# Patient Record
Sex: FEMALE | Race: BLACK OR AFRICAN AMERICAN | Employment: UNEMPLOYED | URBAN - METROPOLITAN AREA
[De-identification: names, ages, dates, MRNs, and addresses within clinical notes are randomized per-mention and may not be internally consistent; named-entity substitution may affect disease eponyms.]

---

## 2022-03-22 ENCOUNTER — HOSPITAL ENCOUNTER (EMERGENCY)
Age: 32
Discharge: HOME | End: 2022-03-22
Attending: EMERGENCY MEDICINE
Payer: MEDICAID

## 2022-03-22 VITALS
SYSTOLIC BLOOD PRESSURE: 164 MMHG | TEMPERATURE: 98.1 F | RESPIRATION RATE: 20 BRPM | HEART RATE: 102 BPM | DIASTOLIC BLOOD PRESSURE: 106 MMHG | OXYGEN SATURATION: 100 %

## 2022-03-22 DIAGNOSIS — G44.019 EPISODIC CLUSTER HEADACHE, NOT INTRACTABLE: Primary | ICD-10-CM

## 2022-03-22 PROCEDURE — 99283 EMERGENCY DEPT VISIT LOW MDM: CPT

## 2022-03-22 PROCEDURE — 6360000002 HC RX W HCPCS: Performed by: EMERGENCY MEDICINE

## 2022-03-22 PROCEDURE — 96374 THER/PROPH/DIAG INJ IV PUSH: CPT

## 2022-03-22 PROCEDURE — 96375 TX/PRO/DX INJ NEW DRUG ADDON: CPT

## 2022-03-22 PROCEDURE — 2580000003 HC RX 258: Performed by: EMERGENCY MEDICINE

## 2022-03-22 RX ORDER — KETOROLAC TROMETHAMINE 30 MG/ML
15 INJECTION, SOLUTION INTRAMUSCULAR; INTRAVENOUS ONCE
Status: COMPLETED | OUTPATIENT
Start: 2022-03-22 | End: 2022-03-22

## 2022-03-22 RX ORDER — LEVOTHYROXINE SODIUM 0.12 MG/1
250 TABLET ORAL
COMMUNITY
Start: 2022-02-01

## 2022-03-22 RX ORDER — NAPROXEN 500 MG/1
500 TABLET ORAL 2 TIMES DAILY WITH MEALS
Qty: 30 TABLET | Refills: 0 | Status: SHIPPED | OUTPATIENT
Start: 2022-03-22 | End: 2022-03-22 | Stop reason: SDUPTHER

## 2022-03-22 RX ORDER — PROCHLORPERAZINE EDISYLATE 5 MG/ML
10 INJECTION INTRAMUSCULAR; INTRAVENOUS ONCE
Status: COMPLETED | OUTPATIENT
Start: 2022-03-22 | End: 2022-03-22

## 2022-03-22 RX ORDER — SODIUM CHLORIDE, SODIUM LACTATE, POTASSIUM CHLORIDE, AND CALCIUM CHLORIDE .6; .31; .03; .02 G/100ML; G/100ML; G/100ML; G/100ML
1000 INJECTION, SOLUTION INTRAVENOUS ONCE
Status: COMPLETED | OUTPATIENT
Start: 2022-03-22 | End: 2022-03-22

## 2022-03-22 RX ORDER — DIPHENHYDRAMINE HYDROCHLORIDE 50 MG/ML
25 INJECTION INTRAMUSCULAR; INTRAVENOUS ONCE
Status: COMPLETED | OUTPATIENT
Start: 2022-03-22 | End: 2022-03-22

## 2022-03-22 RX ORDER — NAPROXEN 500 MG/1
500 TABLET ORAL 2 TIMES DAILY WITH MEALS
Qty: 30 TABLET | Refills: 0 | Status: SHIPPED | OUTPATIENT
Start: 2022-03-22

## 2022-03-22 RX ADMIN — PROCHLORPERAZINE EDISYLATE 10 MG: 5 INJECTION INTRAMUSCULAR; INTRAVENOUS at 13:38

## 2022-03-22 RX ADMIN — KETOROLAC TROMETHAMINE 15 MG: 30 INJECTION, SOLUTION INTRAMUSCULAR at 13:37

## 2022-03-22 RX ADMIN — SODIUM CHLORIDE, SODIUM LACTATE, POTASSIUM CHLORIDE, AND CALCIUM CHLORIDE 1000 ML: .6; .31; .03; .02 INJECTION, SOLUTION INTRAVENOUS at 13:37

## 2022-03-22 RX ADMIN — DIPHENHYDRAMINE HYDROCHLORIDE 25 MG: 50 INJECTION, SOLUTION INTRAMUSCULAR; INTRAVENOUS at 13:38

## 2022-03-22 ASSESSMENT — PAIN DESCRIPTION - FREQUENCY: FREQUENCY: INTERMITTENT

## 2022-03-22 ASSESSMENT — PAIN DESCRIPTION - PAIN TYPE: TYPE: ACUTE PAIN

## 2022-03-22 ASSESSMENT — PAIN SCALES - GENERAL: PAINLEVEL_OUTOF10: 6

## 2022-03-22 ASSESSMENT — PAIN DESCRIPTION - DESCRIPTORS: DESCRIPTORS: HEADACHE;SQUEEZING

## 2022-03-22 ASSESSMENT — PAIN DESCRIPTION - LOCATION: LOCATION: HEAD

## 2022-03-22 NOTE — ED PROVIDER NOTES
[address] [facility]          ATTENDING PHYSICIAN NOTE       Date of evaluation: 3/22/2022    Chief Complaint     Headache (intertmittent pressure headache, +nausea, wakes up with some blood in nose, left eye intermittently twitches)      History of Present Illness     Lou Perez is a 32 y.o. female who presents with 2 weeks of headaches. Possibly had some mild URI type symptoms but endorses diffuse pressure on the head occasional twitching of the left eye and then she has had daily nosebleeds with spotting in her right nare. These quickly go away in the morning with pressure. No large volume bleed. She endorses slow intermittent headache some mild neck pain but no fever stiffness altered mental status trauma or any other issues. It is a bilateral frontal and posterior headache. Denies any other weakness numbness tingling or any other issues. Does endorse occasional eye tearing in R eye with the headaches. Review of Systems     Review of Systems  A full 10 point review of systems was obtained. Pertinent positives and negatives as documented in the HPI, otherwise all other systems were reviewed and were negative. Past Medical, Surgical, Family, and Social History     She has no past medical history on file. She has no past surgical history on file. Her family history is not on file. She reports that she has never smoked. She has never used smokeless tobacco. She reports that she does not drink alcohol and does not use drugs. Medications     Previous Medications    LEVOTHYROXINE (SYNTHROID) 125 MCG TABLET    Take 250 mcg by mouth every morning (before breakfast)       Allergies     She is allergic to sulfamethoxazole-trimethoprim.     Physical Exam     INITIAL VITALS: BP: (!) 164/106, Temp: 98.1 °F (36.7 °C), Pulse: 102, Resp: 20, SpO2: 100 %   Physical Exam  General: Well appearing in NAD,  HEENT:  head is atraumatic, sclera are clear, oropharynx is nonerythematous, mucus membranes are moist  Neck: Trachea midline, full range of motion of the neck without nuchal rigidity  Chest: Nonlabored respirations, clear to auscultation bilaterally  Cardiovascular: Regular rate and rhythm, 2+ radial pulses bilaterally  Abdominal: Nondistended abdomen, soft, nontender without rebound or gaurding  Skin: Warm, dry well perfused, no rashes  Extremities: no obvious deformities, no tenderness to palpation diffusely  Neurologic:   Alert and oriented x4, speech is clear and intact without dysarthria, cranial nerves II-XII are intact, there is 5 out of 5 strength in bilateral upper and lower extremities. PEERL. EOMI without vertical or horizontal nystagmus. There are 2+ deep tendon reflexes. Gait is intact without ataxia. Visual fields are full to confrontation. Able to walk without ataxia. Psychologic: appropriate mood and affect  Diagnostic Results     EKG   None     RADIOLOGY:  No orders to display       LABS:   No results found for this visit on 03/22/22. ED BEDSIDE ULTRASOUND:    RECENT VITALS:  BP: (!) 164/106,Temp: 98.1 °F (36.7 °C), Pulse: 102, Resp: 20, SpO2: 100 %     Procedures         ED Course     Nursing Notes, Past Medical Hx, Past Surgical Hx, Social Hx,Allergies, and Family Hx were reviewed. patient was given the following medications:  Orders Placed This Encounter   Medications    ketorolac (TORADOL) injection 15 mg    prochlorperazine (COMPAZINE) injection 10 mg    diphenhydrAMINE (BENADRYL) injection 25 mg    lactated ringers bolus    naproxen (NAPROSYN) 500 MG tablet     Sig: Take 1 tablet by mouth 2 times daily (with meals)     Dispense:  30 tablet     Refill:  0       CONSULTS:  None    MEDICAL DECISIONMAKING / ASSESSMENT / Terrye Hilary is a 32 y.o. female here with diffuse intermittent headache eye tearing intermittent left eye twitching which is not present here and epistaxis.   Here she has no epistaxis nares are clear without blood. No tearing no neurologic deficit here. Ambulate around in no acute distress. No ataxia on exam as well. This point time with all of the consolation of symptoms appears it could be consistent with a cluster headache possibly even a sinus congestion headache but not that symptomatic care. She was given Toradol, Compazine and Benadryl with improvement of headache symptoms. Will be discharged in stable condition to follow-up with PCP.     Clinical Impression     1. Episodic cluster headache, not intractable        Disposition     PATIENT REFERRED TO:  Milton Stoll  [address] [address]  [address] [facility] [address] [phone]    Schedule an appointment as soon as possible for a visit in 3 days        DISCHARGE MEDICATIONS:  New Prescriptions    NAPROXEN (NAPROSYN) 500 MG TABLET    Take 1 tablet by mouth 2 times daily (with meals)       DISPOSITION Discharge - Pending Orders Complete 03/22/2022 02:35:34 PM       Junior Jolley MD  03/22/22 4108

## 2022-03-22 NOTE — FLOWSHEET NOTE
03/22/22 1343   Encounter Summary   Services provided to: Patient   Referral/Consult From: Rounding   Continue Visiting   (3/22/22, KIKA)   Complexity of Encounter Moderate   Length of Encounter 15 minutes   Routine   Type Initial   Assessment Calm; Approachable   Intervention Nurtured hope   Outcome Expressed gratitude

## 2022-06-04 ENCOUNTER — HOSPITAL ENCOUNTER (EMERGENCY)
Age: 32
Discharge: HOME | End: 2022-06-04
Payer: MEDICAID

## 2022-06-04 VITALS
RESPIRATION RATE: 19 BRPM | WEIGHT: 270 LBS | HEIGHT: 64 IN | HEART RATE: 85 BPM | DIASTOLIC BLOOD PRESSURE: 91 MMHG | OXYGEN SATURATION: 97 % | SYSTOLIC BLOOD PRESSURE: 142 MMHG | BODY MASS INDEX: 46.1 KG/M2 | TEMPERATURE: 99.3 F

## 2022-06-04 DIAGNOSIS — U07.1 COVID-19: Primary | ICD-10-CM

## 2022-06-04 LAB
RAPID INFLUENZA  B AGN: NEGATIVE
RAPID INFLUENZA A AGN: NEGATIVE
SARS-COV-2, NAAT: DETECTED

## 2022-06-04 PROCEDURE — 87804 INFLUENZA ASSAY W/OPTIC: CPT

## 2022-06-04 PROCEDURE — 87635 SARS-COV-2 COVID-19 AMP PRB: CPT

## 2022-06-04 PROCEDURE — 99284 EMERGENCY DEPT VISIT MOD MDM: CPT

## 2022-06-04 RX ORDER — ASCORBIC ACID 500 MG
500 TABLET ORAL 2 TIMES DAILY
Qty: 14 TABLET | Refills: 0 | Status: SHIPPED | OUTPATIENT
Start: 2022-06-04 | End: 2022-06-11

## 2022-06-04 RX ORDER — ZINC SULFATE 50(220)MG
50 CAPSULE ORAL DAILY
Qty: 7 CAPSULE | Refills: 0 | Status: SHIPPED | OUTPATIENT
Start: 2022-06-04 | End: 2022-06-11

## 2022-06-04 RX ORDER — ASCORBIC ACID 500 MG
500 TABLET ORAL 2 TIMES DAILY
Qty: 14 TABLET | Refills: 0 | Status: CANCELLED | OUTPATIENT
Start: 2022-06-04 | End: 2022-06-11

## 2022-06-04 RX ORDER — ZINC SULFATE 50(220)MG
50 CAPSULE ORAL DAILY
Qty: 7 CAPSULE | Refills: 0 | Status: CANCELLED | OUTPATIENT
Start: 2022-06-04 | End: 2022-06-11

## 2022-06-04 RX ORDER — GUAIFENESIN 600 MG/1
600 TABLET, EXTENDED RELEASE ORAL 2 TIMES DAILY
Qty: 20 TABLET | Refills: 0 | Status: SHIPPED | OUTPATIENT
Start: 2022-06-04 | End: 2022-06-14

## 2022-06-04 ASSESSMENT — PAIN DESCRIPTION - FREQUENCY: FREQUENCY: CONTINUOUS

## 2022-06-04 ASSESSMENT — ENCOUNTER SYMPTOMS
VOMITING: 0
SHORTNESS OF BREATH: 0
BACK PAIN: 0
CHEST TIGHTNESS: 0
ABDOMINAL PAIN: 0
DIARRHEA: 0
COUGH: 1
NAUSEA: 0

## 2022-06-04 ASSESSMENT — PAIN DESCRIPTION - DESCRIPTORS: DESCRIPTORS: SHARP;SHOOTING

## 2022-06-04 ASSESSMENT — PAIN - FUNCTIONAL ASSESSMENT
PAIN_FUNCTIONAL_ASSESSMENT: 0-10
PAIN_FUNCTIONAL_ASSESSMENT: 0-10

## 2022-06-04 ASSESSMENT — PAIN DESCRIPTION - PAIN TYPE: TYPE: ACUTE PAIN

## 2022-06-04 ASSESSMENT — PAIN DESCRIPTION - ORIENTATION: ORIENTATION: RIGHT

## 2022-06-04 ASSESSMENT — PAIN SCALES - GENERAL
PAINLEVEL_OUTOF10: 7
PAINLEVEL_OUTOF10: 5

## 2022-06-04 ASSESSMENT — PAIN DESCRIPTION - ONSET: ONSET: PROGRESSIVE

## 2022-06-04 ASSESSMENT — PAIN DESCRIPTION - LOCATION: LOCATION: FLANK

## 2022-06-05 NOTE — ED PROVIDER NOTES
**ADVANCED PRACTICE PROVIDER, I HAVE EVALUATED THIS [facility]  ED  EMERGENCY DEPARTMENT ENCOUNTER      Pt Name: Pastor Fajardo  NWF:[id number]  [place] 1990  Date of evaluation: 6/4/2022  Provider: BEATRIZ Herbert      Chief Complaint:    Chief Complaint   Patient presents with    Concern For COVID-19     pt to ED with c/o concern ofr covid. exposed by friend who was positive. pt reports symptoms started on thursday. body aches, nausea, cough, fever         Nursing Notes, Past Medical Hx, Past Surgical Hx, Social Hx, Allergies, and Family Hx were all reviewed and agreed with or any disagreements were addressed in the HPI.    HPI: (Location, Duration, Timing, Severity, Quality, Assoc Sx, Context, Modifying factors)    Chief Complaint of concern for COVID. This is a  32 y.o. female who presents via private vehicle complaining of fever, body aches, nausea, and nonproductive cough. The patient states her symptoms began 2 days ago and she was exposed to a friend who has Raulito. Exposure was on Sunday, her friend developed symptoms and tested positive on Tuesday. She has been taking ibuprofen and Tylenol for fever reduction. She has had a decreased appetite. She is also had nausea without vomiting. She complains of bright sided flank pain that is intermittent and cramping in nature. No bouts of diarrhea. She denies any dysuria or hematuria. No chest pain or shortness of breath.     PastMedical/Surgical History:      Diagnosis Date    Asthma     Thyroid disease          Procedure Laterality Date    TONSILLECTOMY         Medications:  Previous Medications    LEVOTHYROXINE (SYNTHROID) 125 MCG TABLET    Take 250 mcg by mouth every morning (before breakfast)    NAPROXEN (NAPROSYN) 500 MG TABLET    Take 1 tablet by mouth 2 times daily (with meals)         Review of Systems:  (2-9 systems needed)  Review of Systems   Constitutional: Positive for appetite change, chills, fatigue and fever. HENT: Negative for congestion. Eyes: Negative for visual disturbance. Respiratory: Positive for cough. Negative for chest tightness and shortness of breath. Cardiovascular: Negative for chest pain and palpitations. Gastrointestinal: Negative for abdominal pain, diarrhea, nausea and vomiting. Genitourinary: Negative for dysuria, frequency, hematuria and urgency. Musculoskeletal: Negative for back pain. Skin: Negative for rash. Neurological: Negative for dizziness, weakness, light-headedness and headaches. Physical Exam:  Physical Exam  Vitals and nursing note reviewed. Constitutional:       Appearance: Normal appearance. She is not diaphoretic. HENT:      Head: Normocephalic and atraumatic. Nose: Nose normal.      Mouth/Throat:      Mouth: Mucous membranes are moist.   Eyes:      General:         Right eye: No discharge. Left eye: No discharge. Extraocular Movements: Extraocular movements intact. Pupils: Pupils are equal, round, and reactive to light. Cardiovascular:      Rate and Rhythm: Normal rate and regular rhythm. Pulses: Normal pulses. Heart sounds: Normal heart sounds. No murmur heard. No friction rub. No gallop. Pulmonary:      Effort: Pulmonary effort is normal. No respiratory distress. Breath sounds: Normal breath sounds. No stridor. No wheezing, rhonchi or rales. Abdominal:      General: Abdomen is flat. Palpations: Abdomen is soft. Tenderness: There is no abdominal tenderness. There is no right CVA tenderness, left CVA tenderness, guarding or rebound. Musculoskeletal:         General: Normal range of motion. Cervical back: Normal range of motion and neck supple. Skin:     General: Skin is warm and dry. Coloration: Skin is not pale. Neurological:      Mental Status: She is alert and oriented to person, place, and time.    Psychiatric:         Mood and Affect: Mood normal. Behavior: Behavior normal.         MEDICAL DECISION MAKING    Vitals:    Vitals:    06/04/22 1943   BP: (!) 162/94   Pulse: 98   Resp: 18   Temp: 99.8 °F (37.7 °C)   TempSrc: Oral   SpO2: 96%   Weight: 270 lb (122.5 kg)   Height: 5' 4\" (1.626 m)       LABS:  Labs Reviewed   COVID-19, RAPID - Abnormal; Notable for the following components:       Result Value    SARS-CoV-2, NAAT DETECTED (*)     All other components within normal limits    Narrative:     Ross Dancer tel. [phone],  Microbiology results called to and read back byJeannette Morales RN,  06/04/2022 20:41, by MARY   RAPID INFLUENZA A/B ANTIGENS        Remainder of labs reviewed and were negative at this time or not returned at the time of this note. RADIOLOGY:   Non-plain film images such as CT, Ultrasound and MRI are read by the radiologist. BEATRIZ Ozuna have directly visualized the radiologic plain film image(s) with the below findings:      Interpretation per the Radiologist below, if available at the time of this note:    No orders to display        No results found. MEDICAL DECISION MAKING / ED COURSE:      Patient was seen and evaluated in the emergency department today for concerns of COVID-like symptoms. She is hemodynamically stable at triage. Patient is well-appearing on exam.  She was tested for COVID and flu in triage. She is found to be COVID-positive. We discussed symptomatic care at home. She will continue taking ibuprofen and Tylenol for fever. She will follow-up with her primary care physician early next week for reevaluation. We discussed strict return precautions should she develop any new or worsening symptoms such as worsening cough, shortness of breath, or chest pain she will immediately return to the emergency department. The patient tolerated their visit well. I evaluated the patient. The physician was available for consultation as needed.   The patient and / or the family were informed of the results of any tests, a time was given to answer questions, a plan was proposed and they agreed with plan. CLINICAL IMPRESSION:  1. COVID-19      Results for orders placed or performed during the hospital encounter of 06/04/22   COVID-19, Rapid    Specimen: Nasopharyngeal Swab   Result Value Ref Range    SARS-CoV-2, NAAT DETECTED (AA) Not Detected   Rapid influenza A/B antigens    Specimen: Nasopharyngeal   Result Value Ref Range    Rapid Influenza A Ag Negative Negative    Rapid Influenza B Ag Negative Negative         I estimate there is LOW risk for EPIGLOTTITIS, PNEUMONIA, MENINGITIS, OR URINARY TRACT INFECTION, thus I consider the discharge disposition reasonable. Also, there is no evidence or peritonitis, sepsis, or toxicity. Juan Holm and I have discussed the diagnosis and risks, and we agree with discharging home to follow-up with their primary doctor. We also discussed returning to the Emergency Department immediately if new or worsening symptoms occur. We have discussed the symptoms which are most concerning (e.g., changing or worsening pain, trouble swallowing or breating, neck stiffness, fever) that necessitate immediate return. Final Impression    1. COVID-19        Discharge Vital Signs:  Blood pressure (!) 142/91, pulse 85, temperature 99.3 °F (37.4 °C), temperature source Oral, resp. rate 19, height 5' 4\" (1.626 m), weight 270 lb (122.5 kg), last menstrual period 02/04/2022, SpO2 97 %.   DISPOSITION Decision To Discharge 06/04/2022 09:06:34 PM      PATIENT REFERRED TO:  [facility]  ED  [address] [facility] [address]  Go to   If symptoms worsen    Kalia Amador  [address] [place] [address]    Schedule an appointment as soon as possible for a visit in 3 days        DISCHARGE MEDICATIONS:  New Prescriptions    ASCORBIC ACID (VITAMIN C) 500 MG TABLET    Take 1 tablet by mouth 2 times daily for 7 days GUAIFENESIN (MUCINEX) 600 MG EXTENDED RELEASE TABLET    Take 1 tablet by mouth 2 times daily for 10 days    ZINC SULFATE (ZINCATE) 220 (50 ZN) MG CAPSULE    Take 1 capsule by mouth daily for 7 days       DISCONTINUED MEDICATIONS:  Discontinued Medications    No medications on file              (Please note the MDM and HPI sections of this note were completed with a voice recognition program.  Efforts were made to edit the dictations but occasionally words are mis-transcribed.)    Electronically signed, Deon Sandoval,          Deon Sandoval  06/04/22 8254

## 2023-01-16 ENCOUNTER — HOSPITAL ENCOUNTER (INPATIENT)
Age: 33
LOS: 2 days | Discharge: HOME | DRG: 113 | End: 2023-01-18
Attending: EMERGENCY MEDICINE | Admitting: INTERNAL MEDICINE
Payer: MEDICAID

## 2023-01-16 ENCOUNTER — APPOINTMENT (OUTPATIENT)
Dept: GENERAL RADIOLOGY | Age: 33
DRG: 113 | End: 2023-01-16
Payer: MEDICAID

## 2023-01-16 ENCOUNTER — APPOINTMENT (OUTPATIENT)
Dept: CT IMAGING | Age: 33
DRG: 113 | End: 2023-01-16
Payer: MEDICAID

## 2023-01-16 DIAGNOSIS — R10.9 RIGHT FLANK PAIN: ICD-10-CM

## 2023-01-16 DIAGNOSIS — J06.9 ACUTE UPPER RESPIRATORY INFECTION: Primary | ICD-10-CM

## 2023-01-16 LAB
ALBUMIN SERPL-MCNC: 4 G/DL (ref 3.4–5)
ALP BLD-CCNC: 51 U/L (ref 40–129)
ALT SERPL-CCNC: 39 U/L (ref 10–40)
ANION GAP SERPL CALCULATED.3IONS-SCNC: 10 MMOL/L (ref 3–16)
AST SERPL-CCNC: 41 U/L (ref 15–37)
BASOPHILS ABSOLUTE: 0 K/UL (ref 0–0.2)
BASOPHILS RELATIVE PERCENT: 0.6 %
BILIRUB SERPL-MCNC: 0.4 MG/DL (ref 0–1)
BILIRUBIN DIRECT: <0.2 MG/DL (ref 0–0.3)
BILIRUBIN URINE: NEGATIVE
BILIRUBIN, INDIRECT: ABNORMAL MG/DL (ref 0–1)
BLOOD, URINE: NEGATIVE
BUN BLDV-MCNC: 6 MG/DL (ref 7–20)
C-REACTIVE PROTEIN: 16 MG/L (ref 0–5.1)
CALCIUM SERPL-MCNC: 9.5 MG/DL (ref 8.3–10.6)
CHLORIDE BLD-SCNC: 98 MMOL/L (ref 99–110)
CLARITY: CLEAR
CO2: 26 MMOL/L (ref 21–32)
COLOR: YELLOW
CREAT SERPL-MCNC: 0.9 MG/DL (ref 0.6–1.1)
D DIMER: 0.41 UG/ML FEU (ref 0–0.6)
EOSINOPHILS ABSOLUTE: 0.1 K/UL (ref 0–0.6)
EOSINOPHILS RELATIVE PERCENT: 1.4 %
GFR SERPL CREATININE-BSD FRML MDRD: >60 ML/MIN/{1.73_M2}
GLUCOSE BLD-MCNC: 165 MG/DL (ref 70–99)
GLUCOSE URINE: NEGATIVE MG/DL
HCG(URINE) PREGNANCY TEST: NEGATIVE
HCT VFR BLD CALC: 35.4 % (ref 36–48)
HEMOGLOBIN: 11.7 G/DL (ref 12–16)
INFLUENZA A: DETECTED
INFLUENZA B: NOT DETECTED
KETONES, URINE: NEGATIVE MG/DL
LACTIC ACID: 0.9 MMOL/L (ref 0.4–2)
LEUKOCYTE ESTERASE, URINE: NEGATIVE
LIPASE: 30 U/L (ref 13–60)
LYMPHOCYTES ABSOLUTE: 0.3 K/UL (ref 1–5.1)
LYMPHOCYTES RELATIVE PERCENT: 5.1 %
MCH RBC QN AUTO: 24.4 PG (ref 26–34)
MCHC RBC AUTO-ENTMCNC: 33.2 G/DL (ref 31–36)
MCV RBC AUTO: 73.6 FL (ref 80–100)
MICROSCOPIC EXAMINATION: NORMAL
MONOCYTES ABSOLUTE: 0.7 K/UL (ref 0–1.3)
MONOCYTES RELATIVE PERCENT: 10.4 %
NEUTROPHILS ABSOLUTE: 5.5 K/UL (ref 1.7–7.7)
NEUTROPHILS RELATIVE PERCENT: 82.5 %
NITRITE, URINE: NEGATIVE
PDW BLD-RTO: 17.6 % (ref 12.4–15.4)
PH UA: 7 (ref 5–8)
PLATELET # BLD: 299 K/UL (ref 135–450)
PMV BLD AUTO: 8.7 FL (ref 5–10.5)
POTASSIUM REFLEX MAGNESIUM: 4.2 MMOL/L (ref 3.5–5.1)
PROTEIN UA: NEGATIVE MG/DL
RBC # BLD: 4.8 M/UL (ref 4–5.2)
SARS-COV-2 RNA, RT PCR: NOT DETECTED
SODIUM BLD-SCNC: 134 MMOL/L (ref 136–145)
SPECIFIC GRAVITY UA: 1.01 (ref 1–1.03)
TOTAL PROTEIN: 8.8 G/DL (ref 6.4–8.2)
URINE REFLEX TO CULTURE: NORMAL
URINE TYPE: NORMAL
UROBILINOGEN, URINE: 1 E.U./DL
WBC # BLD: 6.7 K/UL (ref 4–11)

## 2023-01-16 PROCEDURE — 81003 URINALYSIS AUTO W/O SCOPE: CPT

## 2023-01-16 PROCEDURE — 96374 THER/PROPH/DIAG INJ IV PUSH: CPT

## 2023-01-16 PROCEDURE — 93005 ELECTROCARDIOGRAM TRACING: CPT | Performed by: INTERNAL MEDICINE

## 2023-01-16 PROCEDURE — 6360000004 HC RX CONTRAST MEDICATION: Performed by: PHYSICIAN ASSISTANT

## 2023-01-16 PROCEDURE — 84443 ASSAY THYROID STIM HORMONE: CPT

## 2023-01-16 PROCEDURE — 71046 X-RAY EXAM CHEST 2 VIEWS: CPT

## 2023-01-16 PROCEDURE — 96375 TX/PRO/DX INJ NEW DRUG ADDON: CPT

## 2023-01-16 PROCEDURE — 96361 HYDRATE IV INFUSION ADD-ON: CPT

## 2023-01-16 PROCEDURE — 80048 BASIC METABOLIC PNL TOTAL CA: CPT

## 2023-01-16 PROCEDURE — 86140 C-REACTIVE PROTEIN: CPT

## 2023-01-16 PROCEDURE — 2580000003 HC RX 258: Performed by: INTERNAL MEDICINE

## 2023-01-16 PROCEDURE — 87040 BLOOD CULTURE FOR BACTERIA: CPT

## 2023-01-16 PROCEDURE — 6360000002 HC RX W HCPCS: Performed by: PHYSICIAN ASSISTANT

## 2023-01-16 PROCEDURE — 99285 EMERGENCY DEPT VISIT HI MDM: CPT

## 2023-01-16 PROCEDURE — 6360000002 HC RX W HCPCS: Performed by: INTERNAL MEDICINE

## 2023-01-16 PROCEDURE — 84703 CHORIONIC GONADOTROPIN ASSAY: CPT

## 2023-01-16 PROCEDURE — 80076 HEPATIC FUNCTION PANEL: CPT

## 2023-01-16 PROCEDURE — 84480 ASSAY TRIIODOTHYRONINE (T3): CPT

## 2023-01-16 PROCEDURE — 36415 COLL VENOUS BLD VENIPUNCTURE: CPT

## 2023-01-16 PROCEDURE — 85379 FIBRIN DEGRADATION QUANT: CPT

## 2023-01-16 PROCEDURE — 6370000000 HC RX 637 (ALT 250 FOR IP): Performed by: PHYSICIAN ASSISTANT

## 2023-01-16 PROCEDURE — 6370000000 HC RX 637 (ALT 250 FOR IP): Performed by: INTERNAL MEDICINE

## 2023-01-16 PROCEDURE — 87636 SARSCOV2 & INF A&B AMP PRB: CPT

## 2023-01-16 PROCEDURE — 84482 T3 REVERSE: CPT

## 2023-01-16 PROCEDURE — 84439 ASSAY OF FREE THYROXINE: CPT

## 2023-01-16 PROCEDURE — 83605 ASSAY OF LACTIC ACID: CPT

## 2023-01-16 PROCEDURE — 74177 CT ABD & PELVIS W/CONTRAST: CPT

## 2023-01-16 PROCEDURE — 83690 ASSAY OF LIPASE: CPT

## 2023-01-16 PROCEDURE — 85025 COMPLETE CBC W/AUTO DIFF WBC: CPT

## 2023-01-16 PROCEDURE — 2580000003 HC RX 258: Performed by: PHYSICIAN ASSISTANT

## 2023-01-16 PROCEDURE — 1200000000 HC SEMI PRIVATE

## 2023-01-16 RX ORDER — SODIUM CHLORIDE 0.9 % (FLUSH) 0.9 %
5-40 SYRINGE (ML) INJECTION PRN
Status: DISCONTINUED | OUTPATIENT
Start: 2023-01-16 | End: 2023-01-18 | Stop reason: HOSPADM

## 2023-01-16 RX ORDER — ACETAMINOPHEN 325 MG/1
650 TABLET ORAL EVERY 6 HOURS PRN
Status: DISCONTINUED | OUTPATIENT
Start: 2023-01-16 | End: 2023-01-18 | Stop reason: HOSPADM

## 2023-01-16 RX ORDER — UBIDECARENONE 75 MG
50 CAPSULE ORAL DAILY
Status: DISCONTINUED | OUTPATIENT
Start: 2023-01-17 | End: 2023-01-18 | Stop reason: HOSPADM

## 2023-01-16 RX ORDER — ACETAMINOPHEN 650 MG/1
650 SUPPOSITORY RECTAL EVERY 6 HOURS PRN
Status: DISCONTINUED | OUTPATIENT
Start: 2023-01-16 | End: 2023-01-18 | Stop reason: HOSPADM

## 2023-01-16 RX ORDER — POLYETHYLENE GLYCOL 3350 17 G/17G
17 POWDER, FOR SOLUTION ORAL DAILY PRN
Status: DISCONTINUED | OUTPATIENT
Start: 2023-01-16 | End: 2023-01-18 | Stop reason: HOSPADM

## 2023-01-16 RX ORDER — SODIUM CHLORIDE 9 MG/ML
INJECTION, SOLUTION INTRAVENOUS CONTINUOUS
Status: DISCONTINUED | OUTPATIENT
Start: 2023-01-16 | End: 2023-01-18 | Stop reason: HOSPADM

## 2023-01-16 RX ORDER — VITAMIN B COMPLEX
1 CAPSULE ORAL DAILY
COMMUNITY

## 2023-01-16 RX ORDER — SODIUM CHLORIDE 9 MG/ML
INJECTION, SOLUTION INTRAVENOUS PRN
Status: DISCONTINUED | OUTPATIENT
Start: 2023-01-16 | End: 2023-01-18 | Stop reason: HOSPADM

## 2023-01-16 RX ORDER — MORPHINE SULFATE 4 MG/ML
4 INJECTION, SOLUTION INTRAMUSCULAR; INTRAVENOUS ONCE
Status: COMPLETED | OUTPATIENT
Start: 2023-01-16 | End: 2023-01-16

## 2023-01-16 RX ORDER — KETOROLAC TROMETHAMINE 30 MG/ML
15 INJECTION, SOLUTION INTRAMUSCULAR; INTRAVENOUS ONCE
Status: COMPLETED | OUTPATIENT
Start: 2023-01-16 | End: 2023-01-16

## 2023-01-16 RX ORDER — ENOXAPARIN SODIUM 100 MG/ML
30 INJECTION SUBCUTANEOUS 2 TIMES DAILY
Status: DISCONTINUED | OUTPATIENT
Start: 2023-01-16 | End: 2023-01-18 | Stop reason: HOSPADM

## 2023-01-16 RX ORDER — ONDANSETRON 2 MG/ML
4 INJECTION INTRAMUSCULAR; INTRAVENOUS ONCE
Status: COMPLETED | OUTPATIENT
Start: 2023-01-16 | End: 2023-01-16

## 2023-01-16 RX ORDER — SODIUM CHLORIDE 0.9 % (FLUSH) 0.9 %
5-40 SYRINGE (ML) INJECTION EVERY 12 HOURS SCHEDULED
Status: DISCONTINUED | OUTPATIENT
Start: 2023-01-16 | End: 2023-01-18 | Stop reason: HOSPADM

## 2023-01-16 RX ORDER — ACETAMINOPHEN 325 MG/1
650 TABLET ORAL ONCE
Status: COMPLETED | OUTPATIENT
Start: 2023-01-16 | End: 2023-01-16

## 2023-01-16 RX ORDER — LEVOTHYROXINE SODIUM 112 UG/1
224 TABLET ORAL
Status: DISCONTINUED | OUTPATIENT
Start: 2023-01-17 | End: 2023-01-18 | Stop reason: HOSPADM

## 2023-01-16 RX ORDER — OSELTAMIVIR PHOSPHATE 75 MG/1
75 CAPSULE ORAL 2 TIMES DAILY
Status: DISCONTINUED | OUTPATIENT
Start: 2023-01-16 | End: 2023-01-18 | Stop reason: HOSPADM

## 2023-01-16 RX ORDER — ONDANSETRON 2 MG/ML
4 INJECTION INTRAMUSCULAR; INTRAVENOUS EVERY 6 HOURS PRN
Status: DISCONTINUED | OUTPATIENT
Start: 2023-01-16 | End: 2023-01-18 | Stop reason: HOSPADM

## 2023-01-16 RX ORDER — UBIDECARENONE 75 MG
50 CAPSULE ORAL DAILY
COMMUNITY

## 2023-01-16 RX ORDER — KETOROLAC TROMETHAMINE 30 MG/ML
15 INJECTION, SOLUTION INTRAMUSCULAR; INTRAVENOUS EVERY 6 HOURS PRN
Status: DISCONTINUED | OUTPATIENT
Start: 2023-01-16 | End: 2023-01-17

## 2023-01-16 RX ORDER — ONDANSETRON 4 MG/1
4 TABLET, ORALLY DISINTEGRATING ORAL EVERY 8 HOURS PRN
Status: DISCONTINUED | OUTPATIENT
Start: 2023-01-16 | End: 2023-01-18 | Stop reason: HOSPADM

## 2023-01-16 RX ORDER — SODIUM CHLORIDE, SODIUM LACTATE, POTASSIUM CHLORIDE, AND CALCIUM CHLORIDE .6; .31; .03; .02 G/100ML; G/100ML; G/100ML; G/100ML
1000 INJECTION, SOLUTION INTRAVENOUS ONCE
Status: COMPLETED | OUTPATIENT
Start: 2023-01-16 | End: 2023-01-16

## 2023-01-16 RX ADMIN — KETOROLAC TROMETHAMINE 15 MG: 30 INJECTION, SOLUTION INTRAMUSCULAR at 14:41

## 2023-01-16 RX ADMIN — ONDANSETRON 4 MG: 2 INJECTION INTRAMUSCULAR; INTRAVENOUS at 11:20

## 2023-01-16 RX ADMIN — OSELTAMIVIR PHOSPHATE 75 MG: 75 CAPSULE ORAL at 22:11

## 2023-01-16 RX ADMIN — SODIUM CHLORIDE, POTASSIUM CHLORIDE, SODIUM LACTATE AND CALCIUM CHLORIDE 1000 ML: 600; 310; 30; 20 INJECTION, SOLUTION INTRAVENOUS at 11:25

## 2023-01-16 RX ADMIN — ACETAMINOPHEN 650 MG: 325 TABLET ORAL at 22:14

## 2023-01-16 RX ADMIN — ENOXAPARIN SODIUM 30 MG: 100 INJECTION SUBCUTANEOUS at 22:10

## 2023-01-16 RX ADMIN — SODIUM CHLORIDE: 9 INJECTION, SOLUTION INTRAVENOUS at 21:03

## 2023-01-16 RX ADMIN — MORPHINE SULFATE 4 MG: 4 INJECTION INTRAVENOUS at 11:20

## 2023-01-16 RX ADMIN — SODIUM CHLORIDE, PRESERVATIVE FREE 10 ML: 5 INJECTION INTRAVENOUS at 21:00

## 2023-01-16 RX ADMIN — ACETAMINOPHEN 650 MG: 325 TABLET ORAL at 16:12

## 2023-01-16 RX ADMIN — IOPAMIDOL 75 ML: 755 INJECTION, SOLUTION INTRAVENOUS at 12:47

## 2023-01-16 RX ADMIN — SODIUM CHLORIDE, POTASSIUM CHLORIDE, SODIUM LACTATE AND CALCIUM CHLORIDE 1000 ML: 600; 310; 30; 20 INJECTION, SOLUTION INTRAVENOUS at 14:39

## 2023-01-16 ASSESSMENT — PAIN DESCRIPTION - LOCATION: LOCATION: FLANK

## 2023-01-16 ASSESSMENT — PAIN DESCRIPTION - ORIENTATION: ORIENTATION: RIGHT

## 2023-01-16 ASSESSMENT — PAIN DESCRIPTION - FREQUENCY: FREQUENCY: CONTINUOUS

## 2023-01-16 ASSESSMENT — PAIN DESCRIPTION - DESCRIPTORS: DESCRIPTORS: ACHING;SHARP;SHOOTING

## 2023-01-16 ASSESSMENT — PAIN - FUNCTIONAL ASSESSMENT: PAIN_FUNCTIONAL_ASSESSMENT: 0-10

## 2023-01-16 ASSESSMENT — ENCOUNTER SYMPTOMS
COUGH: 1
ABDOMINAL PAIN: 1
VOMITING: 1
EYE REDNESS: 0
SHORTNESS OF BREATH: 0
NAUSEA: 1

## 2023-01-16 ASSESSMENT — PAIN SCALES - GENERAL: PAINLEVEL_OUTOF10: 10

## 2023-01-16 ASSESSMENT — PAIN DESCRIPTION - PAIN TYPE: TYPE: ACUTE PAIN

## 2023-01-16 NOTE — ED PROVIDER NOTES
ED Attending Attestation Note     Date of evaluation: 1/16/2023    This patient was seen by the advance practice provider. I have seen and examined the patient, agree with the workup, evaluation, management and diagnosis. The care plan has been discussed. My assessment reveals awake alert female who had chills yesterday and then developed right lower quadrant right flank pain this morning. She has a history of kidney stones 10 years ago that required stent. She has no dysuria although urine does look dark. She states it feels like that type of pain. She is awake alert abdomen is soft with tenderness in the right mid abdomen right flank no rebound or peritoneal signs.        Severino Braxton MD  01/16/23 1124

## 2023-01-16 NOTE — DISCHARGE INSTRUCTIONS
Stay hydrated by drinking plenty of fluids. Take Tylenol or ibuprofen as needed for pain. Follow-up with your primary care physician. Return to emergency department with new or worsening symptoms.

## 2023-01-16 NOTE — H&P
Hospital Medicine History & Physical      PCP: Umesh Pfeiffer    Date of Admission: 1/16/2023    Date of Service: Pt seen/examined on 1/16/2023 and Admitted to Inpatient with expected LOS greater than two midnights due to medical therapy. Chief Complaint: Right-sided flank pain      History Of Present Illness: The patient is a 28 y.o. female with medical history as below most notable for thyroid disease who presents to [facility] with sudden onset of right-sided flank pain associated with nausea and nonbloody emesis. Patient reports she started feeling ill yesterday with development of subjective chills, cough and sneezing. This morning she started to have right-sided flank pain and emesis. She denies any diarrhea. No known sick contacts. Symptoms resembled her prior kidney stones. Discussed with emergency room physician who reports that patient had normal urinalysis, negative urinary pregnancy test, negative CT abdomen and pelvis. Her vitals were significant for sinus tachycardia which did not improve with 2 L of IV fluids. Medical records reviewed, patient was seen by outpatient endocrinology recently, her TSH was low and free T4 elevated in July, her dose of Synthroid was reduced from 250 to 224 mcg. She was due for repeat testing in few weeks. Past Medical History:        Diagnosis Date    Asthma     Thyroid disease        Past Surgical History:        Procedure Laterality Date    TONSILLECTOMY         Medications Prior to Admission:    Prior to Admission medications    Medication Sig Start Date End Date Taking?  Authorizing Provider   COLLAGEN PO Take by mouth daily   Yes Historical Provider, MD   ASTAXANTHIN PO Take by mouth daily   Yes Historical Provider, MD   ASHWAGANDHA PO Take by mouth daily   Yes Historical Provider, MD   vitamin B-12 (CYANOCOBALAMIN) 100 MCG tablet Take 50 mcg by mouth daily   Yes Historical Provider, MD   b complex vitamins capsule Take 1 capsule by mouth daily   Yes Historical Provider, MD   Probiotic Product (PROBIOTIC-10 PO) Take by mouth daily   Yes Historical Provider, MD   BIOTIN PO Take by mouth daily   Yes Historical Provider, MD   Misc Natural Products (GINSENG-AMERICAN PO) Take by mouth daily   Yes Historical Provider, MD   NONFORMULARY Shatavari tablet daily   Yes Historical Provider, MD   NONFORMULARY Ceramides daily   Yes Historical Provider, MD   naproxen (NAPROSYN) 250 MG tablet Take 250 mg by mouth as needed for Pain   Yes Historical Provider, MD   levothyroxine (SYNTHROID) 112 MCG tablet Take 224 mcg by mouth every morning (before breakfast) 2/1/22   Historical Provider, MD       Allergies:  Sulfamethoxazole-trimethoprim    Social History:  The patient currently lives at home    TOBACCO:   reports that she has never smoked. She has never used smokeless tobacco.  ETOH:   reports current alcohol use. Family History:  Reviewed in detail and Positive as follows:    History reviewed. No pertinent family history. REVIEW OF SYSTEMS:   Positive and negative as noted in the HPI. All other systems reviewed and negative. PHYSICAL EXAM:    BP (!) 143/81   Pulse (!) 110   Temp 98.7 °F (37.1 °C) (Oral)   Resp 16   Ht 5' 4\" (1.626 m)   LMP 10/17/2022 (Approximate)   SpO2 100%   BMI 46.35 kg/m²     General appearance: Mild distress appears stated age and cooperative. HEENT Normal cephalic, atraumatic without obvious deformity. Pupils equal, round, and reactive to light. Extra ocular muscles intact. Conjunctivae/corneas clear. Neck: Supple, No jugular venous distention/bruits. Trachea midline without thyromegaly or adenopathy with full range of motion.   Lungs: Diminished due to body habitus without crackles or wheezing  Heart: Tachycardia, regular rate and rhythm with Normal S1/S2 without murmurs, rubs or gallops, point of maximum impulse non-displaced  Abdomen: Soft, right flank tenderness, right CVA tenderness without guarding  Extremities: No clubbing, cyanosis, or edema bilaterally. Full range of motion without deformity and normal gait intact. Skin: Skin color, texture, turgor normal.   Neurologic: Alert and oriented X 3, grossly non-focal.  Mental status: Alert, oriented, thought content appropriate. Capillary refill is brisk  Peripheral pulses 2+    CXR:  I have reviewed the CXR with the following interpretation: No pleural effusion, consolidation  EKG: Twelve-lead EKG ordered    The following labs reviewed personally:   CBC   Recent Labs     01/16/23  1136   WBC 6.7   HGB 11.7*   HCT 35.4*         RENAL  Recent Labs     01/16/23  1136   *   K 4.2   CL 98*   CO2 26   BUN 6*   CREATININE 0.9     LFT'S  Recent Labs     01/16/23  1136   AST 41*   ALT 39   BILIDIR <0.2   BILITOT 0.4   ALKPHOS 51     COAG  No results for input(s): INR in the last 72 hours. CARDIAC ENZYMES  No results for input(s): CKTOTAL, CKMB, CKMBINDEX, TROPONINI in the last 72 hours.     U/A:    Lab Results   Component Value Date/Time    COLORU Yellow 01/16/2023 11:36 AM    CLARITYU Clear 01/16/2023 11:36 AM    SPECGRAV 1.015 01/16/2023 11:36 AM    LEUKOCYTESUR Negative 01/16/2023 11:36 AM    BLOODU Negative 01/16/2023 11:36 AM    GLUCOSEU Negative 01/16/2023 11:36 AM       ABG  No results found for: MYA9YSS, BEART, L6UHIZSM, PHART, THGBART, JRH6PXE, PO2ART, SWL4LNU        Active Hospital Problems    Diagnosis Date Noted    Abdominal pain [R10.9] 01/16/2023     Priority: Medium         ASSESSMENT/PLAN:    Abdominal pain/right flank pain:  LFTs, CT A/P, UA, preg test all negative  Will check Ddimer, may need to repeat CXR or urinalysis if no etiology found    Tachycardia:  consider early sepsis  Cont with IVF, follow up on thyroid function tests  Monitor on telemetry  Obtain 12 lead EKG, Ddimer and lactic acid  Also obtain blood cx, flu and covid 19  Further treatment based on test results    Hypothyroidism:  Based on chart review- was hyperthyroid in July 2022, dose was lowered from 250 to 224 mcg  Follow up on current testing    DM type 2:  ISS        DVT Prophylaxis: lovenox  Diet: No diet orders on file  Code Status: No Order  PT/OT Eval Status: at baseline    Dispo - inpt       Nona Dolan MD    Thank you HILARIA CANTU for the opportunity to be involved in this patient's care. If you have any questions or concerns please feel free to contact me at [phone].

## 2023-01-16 NOTE — PROGRESS NOTES
Pharmacy  Note  - Admission Medication History    List of abyki-mo-egjuotomy medications is complete.   I reviewed Rx fill history via \"Complete Dispense Report\" in Epic, and spoke to patient      The following changes made to cemwj-iv-hxajmdeus medication list:    ADDED:   1) ashwaganda  2) ASTAXANTHIN PO  3) B complex  4) vitamin D  5) biotin   6) collagen  7) ginseng  8) shatavari  9) ceramides  10) probiotic  11)B-12    Dose or Frequency CHANGE:  1) levothyroxine 125 mcg 2 tablets daily--> 112 mcg 2 tablets daily         Current Outpatient Medications   Medication Instructions    ASHWAGANDHA PO Oral, DAILY    ASTAXANTHIN PO Oral, DAILY    b complex vitamins capsule 1 capsule, Oral, DAILY    BIOTIN PO Oral, DAILY    COLLAGEN PO Oral, DAILY    levothyroxine (SYNTHROID) 224 mcg, Oral, DAILY BEFORE BREAKFAST    Misc Natural Products (GINSENG-AMERICAN PO) Oral, DAILY    NAPROXEN  mg, Oral, PRN    NONFORMULARY Shatavari tablet daily    NONFORMULARY Ceramides daily    Probiotic Product (PROBIOTIC-10 PO) Oral, DAILY    vitamin B-12 (CYANOCOBALAMIN) 50 mcg, Oral, DAILY            1/16/2023 5:39 PM  Rigo Rowell  PharmD Candidate   Class of 2023

## 2023-01-16 NOTE — ED PROVIDER NOTES
THE [facility]  EMERGENCY DEPARTMENT ENCOUNTER          NURSE PRACTITIONER NOTE       Date of evaluation: 1/16/2023    Chief Complaint     Flank Pain (Flank pain started this AM. States she had a kidney stone back in 2013 and pain feels the same. Also nausea and vomiting) and Fever (Reports fever, chills, cough, etc started yesterday. States she did home covid test that came back negative. )      History of Present Illness     Jaimee Rodriguez is a 32 y.o. female who presents with right flank pain.  Patient reports that she developed chills and a cough last night.  Early this morning, she states that she developed sharp right-sided flank pain that has been constant with radiation to the anterior abdomen.  She states that this pain is been associated with nausea and vomiting, mostly stomach acid.  She states that her symptoms are similar to prior history of kidney stones.  She denies any dysuria, hematuria, urinary frequency.  No change in bowel habits.  She reports prior cyst kidney stone was back in 2013 and required stent.  She does not currently follow with urology.    ASSESSMENT / PLAN  (MEDICAL DECISION MAKING)     INITIAL VITALS: BP: (!) 160/91, Temp: 98.6 °F (37 °C), Heart Rate: (!) 110, Resp: 22, SpO2: 100 %     Jaimee Rodriguez is a 32 y.o. female with right flank pain, nausea and vomiting, chills and cough since early this morning / last night.  On exam, patient appears uncomfortable.  She is afebrile but tachycardic and hypertensive.  Heart rhythm regular.  Lungs clear but patient taking shallow breaths due to the pain.  There is right CVA tenderness and tenderness in the right upper quadrant without rebound or guarding.  IV access was established.  Patient given 1 L bolus of IV fluids, Zofran and morphine for her symptoms.  Laboratory work-up grossly unremarkable. UA without evidence of blood or infection . CXR negative. CT A/P revealed no acute findings. Given unremarkable workup, suspicion for URI with  MSK pain. On reassessment, patient reports symptoms have improved but she remains tachycardic. She was given additional liter bolus of fluids and toradol but still noted to have persistent tachycardia. As patient does have history of thyroid disease, thyroid storm was considered. TSH/Free T4 added on. But clinical picture does not fit. Also considered pulmonary embolism; however, patient's pain is not pleuritic. She is not hypoxic. Low suspicion for PE as etiology of patient's symptoms. Would consider adding on ddimer but if positive, patient has already received contrast load today. Given persistent tachycardia and recurrent symptoms on repeat assessment, will plan to admit patient for ongoing evaluation and management. Medical Decision Making  Amount and/or Complexity of Data Reviewed  Labs: ordered. Decision-making details documented in ED Course. Radiology: ordered and independent interpretation performed. Decision-making details documented in ED Course. Risk  OTC drugs. Prescription drug management. Decision regarding hospitalization. This patient was also evaluated by the attending physician. All care plans werediscussed and agreed upon. Clinical Impression     1. Acute upper respiratory infection    2. Right flank pain        Disposition     PATIENT REFERRED TO:  No follow-up provider specified. DISCHARGE MEDICATIONS:  New Prescriptions    No medications on file       DISPOSITION  Decision to admit. Diagnostic Results And Other Data         RADIOLOGY:  XR CHEST (2 VW)    (Results Pending)       LABS:   Results for orders placed or performed during the hospital encounter of 01/16/23   Urinalysis with Reflex to Culture    Specimen: Urine   Result Value Ref Range    Urine Type         ED BEDSIDE ULTRASOUND:  No results found.     MOST RECENT VITALS:  BP: (!) 160/91, Temp: 98.6 °F (37 °C), Heart Rate: (!) 110, Resp: 22, SpO2: 100 %     Procedures         ED Course     Nursing Notes, Past Medical Hx, Past Surgical Hx, Social Hx, Allergies, and Family Hx were reviewed.         The patient was given the following medications:  Orders Placed This Encounter   Medications    lactated ringers bolus    ondansetron (ZOFRAN) injection 4 mg    morphine injection 4 mg       CONSULTS:  None    Review of Systems     Review of Systems   Constitutional:  Positive for chills. Negative for fever.   HENT:  Negative for congestion.    Eyes:  Negative for redness.   Respiratory:  Positive for cough. Negative for shortness of breath.    Cardiovascular:  Negative for chest pain.   Gastrointestinal:  Positive for abdominal pain, nausea and vomiting.   Genitourinary:  Positive for flank pain. Negative for difficulty urinating, dysuria and frequency.   Musculoskeletal:  Negative for gait problem.   Skin:  Negative for wound.   Neurological:  Negative for dizziness.   Psychiatric/Behavioral:  The patient is not nervous/anxious.      Past Medical, Surgical, Family, and Social History     She has a past medical history of Asthma and Thyroid disease.  She has a past surgical history that includes Tonsillectomy.  Her family history is not on file.  She reports that she has never smoked. She has never used smokeless tobacco. She reports current alcohol use. She reports that she does not use drugs.    Medications     Previous Medications    ASCORBIC ACID (VITAMIN C) 500 MG TABLET    Take 1 tablet by mouth 2 times daily for 7 days    LEVOTHYROXINE (SYNTHROID) 125 MCG TABLET    Take 250 mcg by mouth every morning (before breakfast)    NAPROXEN (NAPROSYN) 500 MG TABLET    Take 1 tablet by mouth 2 times daily (with meals)    ZINC SULFATE (ZINCATE) 220 (50 ZN) MG CAPSULE    Take 1 capsule by mouth daily for 7 days       Allergies     She is allergic to sulfamethoxazole-trimethoprim.    Physical Exam     INITIAL VITALS: BP: (!) 160/91, Temp: 98.6 °F (37 °C), Heart Rate: (!) 110, Resp: 22, SpO2: 100 %   Physical Exam  Vitals and  nursing note reviewed. Constitutional:       General: She is not in acute distress. HENT:      Head: Normocephalic and atraumatic. Eyes:      Extraocular Movements: Extraocular movements intact. Conjunctiva/sclera: Conjunctivae normal.   Cardiovascular:      Rate and Rhythm: Normal rate and regular rhythm. Pulmonary:      Effort: Pulmonary effort is normal. No respiratory distress. Breath sounds: Normal breath sounds. No wheezing, rhonchi or rales. Abdominal:      General: Bowel sounds are normal. There is no distension. Palpations: Abdomen is soft. Tenderness: There is abdominal tenderness. There is right CVA tenderness. There is no guarding or rebound. Musculoskeletal:         General: No deformity. Cervical back: Neck supple. Skin:     General: Skin is warm and dry. Neurological:      Mental Status: She is alert and oriented to person, place, and time.    Psychiatric:         Mood and Affect: Mood normal.         Behavior: Behavior normal.            Andrew Mccullough PA-C  01/17/23 [id number]

## 2023-01-17 LAB
A/G RATIO: 0.9 (ref 1.1–2.2)
ALBUMIN SERPL-MCNC: 3.5 G/DL (ref 3.4–5)
ALP BLD-CCNC: 44 U/L (ref 40–129)
ALT SERPL-CCNC: 40 U/L (ref 10–40)
ANION GAP SERPL CALCULATED.3IONS-SCNC: 12 MMOL/L (ref 3–16)
AST SERPL-CCNC: 42 U/L (ref 15–37)
BASOPHILS ABSOLUTE: 0 K/UL (ref 0–0.2)
BASOPHILS RELATIVE PERCENT: 0.5 %
BILIRUB SERPL-MCNC: 0.3 MG/DL (ref 0–1)
BUN BLDV-MCNC: 6 MG/DL (ref 7–20)
CALCIUM SERPL-MCNC: 8.6 MG/DL (ref 8.3–10.6)
CHLORIDE BLD-SCNC: 99 MMOL/L (ref 99–110)
CO2: 25 MMOL/L (ref 21–32)
CREAT SERPL-MCNC: 0.9 MG/DL (ref 0.6–1.1)
EKG ATRIAL RATE: 92 BPM
EKG DIAGNOSIS: NORMAL
EKG P AXIS: 40 DEGREES
EKG P-R INTERVAL: 176 MS
EKG Q-T INTERVAL: 338 MS
EKG QRS DURATION: 80 MS
EKG QTC CALCULATION (BAZETT): 417 MS
EKG R AXIS: 25 DEGREES
EKG T AXIS: 26 DEGREES
EKG VENTRICULAR RATE: 92 BPM
EOSINOPHILS ABSOLUTE: 0.1 K/UL (ref 0–0.6)
EOSINOPHILS RELATIVE PERCENT: 0.9 %
GFR SERPL CREATININE-BSD FRML MDRD: >60 ML/MIN/{1.73_M2}
GLUCOSE BLD-MCNC: 136 MG/DL (ref 70–99)
HCT VFR BLD CALC: 33.5 % (ref 36–48)
HEMOGLOBIN: 10.8 G/DL (ref 12–16)
LYMPHOCYTES ABSOLUTE: 0.7 K/UL (ref 1–5.1)
LYMPHOCYTES RELATIVE PERCENT: 12.9 %
MAGNESIUM: 1.6 MG/DL (ref 1.8–2.4)
MCH RBC QN AUTO: 24.1 PG (ref 26–34)
MCHC RBC AUTO-ENTMCNC: 32.1 G/DL (ref 31–36)
MCV RBC AUTO: 74.9 FL (ref 80–100)
MONOCYTES ABSOLUTE: 0.9 K/UL (ref 0–1.3)
MONOCYTES RELATIVE PERCENT: 16.2 %
NEUTROPHILS ABSOLUTE: 4 K/UL (ref 1.7–7.7)
NEUTROPHILS RELATIVE PERCENT: 69.5 %
PDW BLD-RTO: 18 % (ref 12.4–15.4)
PLATELET # BLD: 259 K/UL (ref 135–450)
PMV BLD AUTO: 8.3 FL (ref 5–10.5)
POTASSIUM REFLEX MAGNESIUM: 3.2 MMOL/L (ref 3.5–5.1)
RBC # BLD: 4.47 M/UL (ref 4–5.2)
SODIUM BLD-SCNC: 136 MMOL/L (ref 136–145)
T3 TOTAL: 0.75 NG/ML (ref 0.8–2)
T4 FREE: 1.6 NG/DL (ref 0.9–1.8)
TOTAL PROTEIN: 7.4 G/DL (ref 6.4–8.2)
TSH REFLEX: 0.16 UIU/ML (ref 0.27–4.2)
WBC # BLD: 5.7 K/UL (ref 4–11)

## 2023-01-17 PROCEDURE — 83735 ASSAY OF MAGNESIUM: CPT

## 2023-01-17 PROCEDURE — 6370000000 HC RX 637 (ALT 250 FOR IP): Performed by: INTERNAL MEDICINE

## 2023-01-17 PROCEDURE — 36415 COLL VENOUS BLD VENIPUNCTURE: CPT

## 2023-01-17 PROCEDURE — 6360000002 HC RX W HCPCS: Performed by: INTERNAL MEDICINE

## 2023-01-17 PROCEDURE — 2580000003 HC RX 258: Performed by: INTERNAL MEDICINE

## 2023-01-17 PROCEDURE — 83036 HEMOGLOBIN GLYCOSYLATED A1C: CPT

## 2023-01-17 PROCEDURE — 85025 COMPLETE CBC W/AUTO DIFF WBC: CPT

## 2023-01-17 PROCEDURE — 80053 COMPREHEN METABOLIC PANEL: CPT

## 2023-01-17 PROCEDURE — 1200000000 HC SEMI PRIVATE

## 2023-01-17 RX ORDER — POTASSIUM CHLORIDE 20 MEQ/1
20 TABLET, EXTENDED RELEASE ORAL 2 TIMES DAILY WITH MEALS
Status: COMPLETED | OUTPATIENT
Start: 2023-01-17 | End: 2023-01-17

## 2023-01-17 RX ORDER — METHOCARBAMOL 750 MG/1
750 TABLET, FILM COATED ORAL 2 TIMES DAILY PRN
Status: DISCONTINUED | OUTPATIENT
Start: 2023-01-17 | End: 2023-01-18 | Stop reason: HOSPADM

## 2023-01-17 RX ORDER — MAGNESIUM SULFATE IN WATER 40 MG/ML
2000 INJECTION, SOLUTION INTRAVENOUS ONCE
Status: COMPLETED | OUTPATIENT
Start: 2023-01-17 | End: 2023-01-17

## 2023-01-17 RX ORDER — PROMETHAZINE HYDROCHLORIDE 25 MG/1
12.5 TABLET ORAL EVERY 6 HOURS PRN
Status: DISCONTINUED | OUTPATIENT
Start: 2023-01-17 | End: 2023-01-18 | Stop reason: HOSPADM

## 2023-01-17 RX ORDER — NAPROXEN 500 MG/1
250 TABLET ORAL 2 TIMES DAILY WITH MEALS
Status: DISCONTINUED | OUTPATIENT
Start: 2023-01-17 | End: 2023-01-18 | Stop reason: HOSPADM

## 2023-01-17 RX ADMIN — ACETAMINOPHEN 650 MG: 325 TABLET ORAL at 09:46

## 2023-01-17 RX ADMIN — OSELTAMIVIR PHOSPHATE 75 MG: 75 CAPSULE ORAL at 20:30

## 2023-01-17 RX ADMIN — LEVOTHYROXINE SODIUM 224 MCG: 0.11 TABLET ORAL at 06:31

## 2023-01-17 RX ADMIN — ONDANSETRON 4 MG: 2 INJECTION INTRAMUSCULAR; INTRAVENOUS at 21:01

## 2023-01-17 RX ADMIN — OSELTAMIVIR PHOSPHATE 75 MG: 75 CAPSULE ORAL at 09:46

## 2023-01-17 RX ADMIN — VITAM B12 50 MCG: 100 TAB at 09:46

## 2023-01-17 RX ADMIN — ONDANSETRON 4 MG: 2 INJECTION INTRAMUSCULAR; INTRAVENOUS at 02:44

## 2023-01-17 RX ADMIN — ENOXAPARIN SODIUM 30 MG: 100 INJECTION SUBCUTANEOUS at 20:29

## 2023-01-17 RX ADMIN — NAPROXEN 250 MG: 500 TABLET ORAL at 18:25

## 2023-01-17 RX ADMIN — SODIUM CHLORIDE: 9 INJECTION, SOLUTION INTRAVENOUS at 04:54

## 2023-01-17 RX ADMIN — METHOCARBAMOL 750 MG: 750 TABLET ORAL at 18:25

## 2023-01-17 RX ADMIN — MAGNESIUM SULFATE HEPTAHYDRATE 2000 MG: 40 INJECTION, SOLUTION INTRAVENOUS at 09:49

## 2023-01-17 RX ADMIN — POTASSIUM CHLORIDE 20 MEQ: 1500 TABLET, EXTENDED RELEASE ORAL at 18:26

## 2023-01-17 RX ADMIN — POTASSIUM CHLORIDE 20 MEQ: 1500 TABLET, EXTENDED RELEASE ORAL at 09:47

## 2023-01-17 RX ADMIN — KETOROLAC TROMETHAMINE 15 MG: 30 INJECTION, SOLUTION INTRAMUSCULAR at 05:13

## 2023-01-17 ASSESSMENT — PAIN SCALES - GENERAL
PAINLEVEL_OUTOF10: 0
PAINLEVEL_OUTOF10: 0
PAINLEVEL_OUTOF10: 7
PAINLEVEL_OUTOF10: 7
PAINLEVEL_OUTOF10: 8
PAINLEVEL_OUTOF10: 0

## 2023-01-17 NOTE — PROGRESS NOTES
4 Eyes Admission Assessment     I agree as the admission nurse that 2 RN's have performed a thorough Head to Toe Skin Assessment on the patient. ALL assessment sites listed below have been assessed on admission. Areas assessed by both nurses:   [x]   Head, Face, and Ears   [x]   Shoulders, Back, and Chest  [x]   Arms, Elbows, and Hands   [x]   Coccyx, Sacrum, and Ischium  [x]   Legs, Feet, and Heels        Does the Patient have Skin Breakdown?   No         Niall Prevention initiated:  No   Wound Care Orders initiated:  No      [facility] nurse consulted for Pressure Injury (Stage 3,4, Unstageable, DTI, NWPT, and Complex wounds) or Niall score 18 or lower:  No      Nurse 1 eSignature: Electronically signed by Donavon Cramer RN on 1/16/23 at 9:14 PM EST    SHARE this note so that the co-signing nurse is able to place an [facility] 57    Nurse 2 eSignature: Electronically signed by Octavia Cameron RN on 1/17/23 at 4:27 AM EST

## 2023-01-17 NOTE — PROGRESS NOTES
Hospitalist Progress Note      PCP: Desiree Lr    Date of Admission: 1/16/2023    Chief Complaint: R sided flank/back pain    Subjective:     Minimal cough  Still has pain but better today  Still had emesis today    Medications:  Reviewed    Infusion Medications    sodium chloride 125 mL/hr at 01/17/23 0655    sodium chloride       Scheduled Medications    sodium chloride flush  5-40 mL IntraVENous 2 times per day    enoxaparin  30 mg SubCUTAneous BID    levothyroxine  224 mcg Oral QAM AC    vitamin B-12  50 mcg Oral Daily    oseltamivir  75 mg Oral BID     PRN Meds: promethazine, ketorolac, sodium chloride flush, sodium chloride, ondansetron **OR** ondansetron, polyethylene glycol, acetaminophen **OR** acetaminophen      Intake/Output Summary (Last 24 hours) at 1/17/2023 0925  Last data filed at 1/17/2023 0655  Gross per 24 hour   Intake 1182.92 ml   Output 500 ml   Net 682.92 ml       Exam:    /86   Pulse 96   Temp 99.8 °F (37.7 °C) (Oral)   Resp 18   Ht 5' 4\" (1.626 m)   Wt 282 lb 13.6 oz (128.3 kg)   LMP 10/17/2022 (Approximate)   SpO2 92%   BMI 48.55 kg/m²     General appearance: No apparent distress, appears stated age and cooperative. HEENT: Pupils equal, round, and reactive to light. Conjunctivae/corneas clear. Neck: Supple, with full range of motion. No jugular venous distention. Trachea midline. Respiratory:  Normal respiratory effort. Clear to auscultation, bilaterally without Rales/Wheezes/Rhonchi. Cardiovascular: Regular rate and rhythm with normal S1/S2 without murmurs, rubs or gallops. Abdomen: Soft, non-tender, non-distended with normal bowel sounds. Musculoskelatal: No clubbing, cyanosis or edema bilaterally. Skin: Skin color, texture, turgor normal.  No rashes or lesions.   Neurologic:  Cranial nerves: II-XII intact, grossly non-focal.  Psychiatric: Alert and oriented, thought content appropriate, normal insight    Labs:   Recent Labs     01/16/23  1136 01/17/23  0459   WBC 6.7 5.7   HGB 11.7* 10.8*   HCT 35.4* 33.5*    259     Recent Labs     01/16/23  1136 01/17/23  0459   * 136   K 4.2 3.2*   CL 98* 99   CO2 26 25   BUN 6* 6*   CREATININE 0.9 0.9   CALCIUM 9.5 8.6     Recent Labs     01/16/23  1136 01/17/23 0459   AST 41* 42*   ALT 39 40   BILIDIR <0.2  --    BILITOT 0.4 0.3   ALKPHOS 51 44     No results for input(s): INR in the last 72 hours. No results for input(s): Kendall Lager in the last 72 hours. Studies:  CT ABDOMEN PELVIS W IV CONTRAST Additional Contrast? None   Final Result   1. No acute abnormality of the abdomen or pelvis. 2. Normal appendix. 3. No obstructing urolithiasis. 4. Hepatic steatosis. XR CHEST (2 VW)   Final Result      No acute cardiopulmonary findings. Assessment/Plan:    Active Hospital Problems    Diagnosis Date Noted    Abdominal pain [R10.9] 01/16/2023     Priority: Medium     Flu A  Tamiflu  Continue IV fluids and anti emetics    Abdominal pain/right flank pain   LFTs, CT A/P, UA, preg test all negative  D-dimer neg  Reviewed CT AP     Tachycardia: likely due to Flu  consider early sepsis  Cont with IVF, follow up on thyroid function tests  Monitor on telemetry  Also obtain blood cx, flu and covid 19     Hypothyroidism:  Based on chart review- was hyperthyroid in July 2022, dose was lowered from 250 to 224 mcg  Follow up on current testing    Hypokalemia and hypomagnesemia  Replace and recheck     DM type 2:  ISS     DVT Prophylaxis: lovenox  Diet: ADULT DIET; Regular  Code Status: Full Code    PT/OT Eval Status:     Dispo - Inpatient dc when tolerating PO.      Gill Moon DO

## 2023-01-17 NOTE — PROGRESS NOTES
Patient A&Ox4. VSS. IV fluids infusing per order. Pt placed on tele per order. EKG completed- NSR. Pt received prn zofran once for nausea and tylenol once for pain per order. Pt oriented to room and how to use call light. Pt in droplet isolation for influenza. Bed is in lowest setting with 2/4 side rails up. Call light is within reach.

## 2023-01-17 NOTE — PROGRESS NOTES
Patient alert and oriented x4. VSS with the exception of slightly elevated HR. IVF infusing per orders. Patient has been given tylenol x1 for R sided flank pain that is uncontrolled at times. Pt UAL and urinating with no issues. Has had minimal intake at this point. Patient denies any other needs at this time. Bed is in the lowest position, call light and bedside table within reach. Will continue to monitor for changes in patient status.      Electronically signed by Ingrid Gregory RN on 1/17/2023 at 2:25 PM

## 2023-01-17 NOTE — PROGRESS NOTES
01/17/23 1502   Encounter Summary   Encounter Overview/Reason  Initial Encounter   Service Provided For: Patient   Referral/Consult From: Rachelle   Last Encounter  01/17/23   Complexity of Encounter Moderate   Begin Time 1420   End Time  1425   Total Time Calculated 5 min   Encounter    Type Initial Screen/Assessment   Assessment/Intervention/Outcome   Assessment Calm; Hopeful   Intervention Active listening   Outcome Expressed Gratitude; Refused/Declined   Staff Judy Garcia MA, [facility]

## 2023-01-17 NOTE — PROGRESS NOTES
Pharmacist Review and Automatic Dose Adjustment of Prophylactic Enoxaparin         The reviewing pharmacist has made an adjustment to the ordered enoxaparin dose or converted to UFH per the approved [facility] protocol and table as identified below. Kirstin Irvin is a 28 y.o. female. Recent Labs     01/16/23  1136   CREATININE 0.9       Estimated Creatinine Clearance: 119 mL/min (based on SCr of 0.9 mg/dL). Recent Labs     01/16/23  1136   HGB 11.7*   HCT 35.4*        No results for input(s): INR in the last 72 hours.     Height:   Ht Readings from Last 1 Encounters:   01/16/23 5' 4\" (1.626 m)     Weight:  Wt Readings from Last 1 Encounters:   01/16/23 282 lb 13.6 oz (128.3 kg)               Plan: Based upon the patient's weight and renal function    Ordered: Enoxaparin 40mg Daily    Changed/converted to    New Order: Enoxaparin 30mg SUBQ BID      Thank you,  Sharon Rowell, [facility]  1/16/2023, 9:02 PM

## 2023-01-18 VITALS
SYSTOLIC BLOOD PRESSURE: 119 MMHG | DIASTOLIC BLOOD PRESSURE: 86 MMHG | RESPIRATION RATE: 17 BRPM | TEMPERATURE: 97.2 F | HEIGHT: 64 IN | WEIGHT: 282.85 LBS | BODY MASS INDEX: 48.29 KG/M2 | OXYGEN SATURATION: 96 % | HEART RATE: 87 BPM

## 2023-01-18 LAB
ALBUMIN SERPL-MCNC: 3.5 G/DL (ref 3.4–5)
ALP BLD-CCNC: 42 U/L (ref 40–129)
ALT SERPL-CCNC: 43 U/L (ref 10–40)
ANION GAP SERPL CALCULATED.3IONS-SCNC: 9 MMOL/L (ref 3–16)
AST SERPL-CCNC: 43 U/L (ref 15–37)
BASOPHILS ABSOLUTE: 0 K/UL (ref 0–0.2)
BASOPHILS RELATIVE PERCENT: 1 %
BILIRUB SERPL-MCNC: <0.2 MG/DL (ref 0–1)
BILIRUBIN DIRECT: <0.2 MG/DL (ref 0–0.3)
BILIRUBIN URINE: NEGATIVE
BILIRUBIN, INDIRECT: ABNORMAL MG/DL (ref 0–1)
BLOOD, URINE: NEGATIVE
BUN BLDV-MCNC: 6 MG/DL (ref 7–20)
CALCIUM SERPL-MCNC: 8.3 MG/DL (ref 8.3–10.6)
CHLORIDE BLD-SCNC: 103 MMOL/L (ref 99–110)
CLARITY: CLEAR
CO2: 26 MMOL/L (ref 21–32)
COLOR: YELLOW
CREAT SERPL-MCNC: 0.8 MG/DL (ref 0.6–1.1)
EOSINOPHILS ABSOLUTE: 0.3 K/UL (ref 0–0.6)
EOSINOPHILS RELATIVE PERCENT: 5.7 %
ESTIMATED AVERAGE GLUCOSE: 148.5 MG/DL
GFR SERPL CREATININE-BSD FRML MDRD: >60 ML/MIN/{1.73_M2}
GLUCOSE BLD-MCNC: 131 MG/DL (ref 70–99)
GLUCOSE URINE: NEGATIVE MG/DL
HBA1C MFR BLD: 6.8 %
HCT VFR BLD CALC: 32.2 % (ref 36–48)
HEMOGLOBIN: 10.4 G/DL (ref 12–16)
KETONES, URINE: NEGATIVE MG/DL
LEUKOCYTE ESTERASE, URINE: NEGATIVE
LYMPHOCYTES ABSOLUTE: 1.3 K/UL (ref 1–5.1)
LYMPHOCYTES RELATIVE PERCENT: 28.8 %
MAGNESIUM: 2 MG/DL (ref 1.8–2.4)
MCH RBC QN AUTO: 24.2 PG (ref 26–34)
MCHC RBC AUTO-ENTMCNC: 32.4 G/DL (ref 31–36)
MCV RBC AUTO: 74.6 FL (ref 80–100)
MICROSCOPIC EXAMINATION: NORMAL
MONOCYTES ABSOLUTE: 0.5 K/UL (ref 0–1.3)
MONOCYTES RELATIVE PERCENT: 12 %
NEUTROPHILS ABSOLUTE: 2.3 K/UL (ref 1.7–7.7)
NEUTROPHILS RELATIVE PERCENT: 52.5 %
NITRITE, URINE: NEGATIVE
PDW BLD-RTO: 18.1 % (ref 12.4–15.4)
PH UA: 7 (ref 5–8)
PLATELET # BLD: 254 K/UL (ref 135–450)
PMV BLD AUTO: 8.4 FL (ref 5–10.5)
POTASSIUM SERPL-SCNC: 3.3 MMOL/L (ref 3.5–5.1)
PROTEIN UA: NEGATIVE MG/DL
RBC # BLD: 4.32 M/UL (ref 4–5.2)
SODIUM BLD-SCNC: 138 MMOL/L (ref 136–145)
SPECIFIC GRAVITY UA: 1.01 (ref 1–1.03)
TOTAL PROTEIN: 7.1 G/DL (ref 6.4–8.2)
URINE REFLEX TO CULTURE: NORMAL
URINE TYPE: NORMAL
UROBILINOGEN, URINE: 0.2 E.U./DL
WBC # BLD: 4.4 K/UL (ref 4–11)

## 2023-01-18 PROCEDURE — 85025 COMPLETE CBC W/AUTO DIFF WBC: CPT

## 2023-01-18 PROCEDURE — 6370000000 HC RX 637 (ALT 250 FOR IP): Performed by: INTERNAL MEDICINE

## 2023-01-18 PROCEDURE — 80048 BASIC METABOLIC PNL TOTAL CA: CPT

## 2023-01-18 PROCEDURE — 36415 COLL VENOUS BLD VENIPUNCTURE: CPT

## 2023-01-18 PROCEDURE — 81003 URINALYSIS AUTO W/O SCOPE: CPT

## 2023-01-18 PROCEDURE — 80076 HEPATIC FUNCTION PANEL: CPT

## 2023-01-18 PROCEDURE — 83735 ASSAY OF MAGNESIUM: CPT

## 2023-01-18 PROCEDURE — 2580000003 HC RX 258: Performed by: INTERNAL MEDICINE

## 2023-01-18 RX ORDER — METHOCARBAMOL 750 MG/1
750 TABLET, FILM COATED ORAL 2 TIMES DAILY PRN
Qty: 10 TABLET | Refills: 0 | Status: SHIPPED | OUTPATIENT
Start: 2023-01-18 | End: 2023-01-23

## 2023-01-18 RX ORDER — OSELTAMIVIR PHOSPHATE 75 MG/1
75 CAPSULE ORAL 2 TIMES DAILY
Qty: 6 CAPSULE | Refills: 0 | Status: SHIPPED | OUTPATIENT
Start: 2023-01-18 | End: 2023-01-21

## 2023-01-18 RX ORDER — POTASSIUM CHLORIDE 20 MEQ/1
40 TABLET, EXTENDED RELEASE ORAL 2 TIMES DAILY WITH MEALS
Status: DISCONTINUED | OUTPATIENT
Start: 2023-01-18 | End: 2023-01-18 | Stop reason: HOSPADM

## 2023-01-18 RX ADMIN — SODIUM CHLORIDE: 9 INJECTION, SOLUTION INTRAVENOUS at 02:20

## 2023-01-18 RX ADMIN — VITAM B12 50 MCG: 100 TAB at 08:38

## 2023-01-18 RX ADMIN — POTASSIUM CHLORIDE 40 MEQ: 1500 TABLET, EXTENDED RELEASE ORAL at 12:39

## 2023-01-18 RX ADMIN — OSELTAMIVIR PHOSPHATE 75 MG: 75 CAPSULE ORAL at 09:32

## 2023-01-18 RX ADMIN — ACETAMINOPHEN 650 MG: 325 TABLET ORAL at 14:21

## 2023-01-18 RX ADMIN — LEVOTHYROXINE SODIUM 224 MCG: 0.11 TABLET ORAL at 06:31

## 2023-01-18 ASSESSMENT — PAIN SCALES - GENERAL
PAINLEVEL_OUTOF10: 0
PAINLEVEL_OUTOF10: 3
PAINLEVEL_OUTOF10: 0

## 2023-01-18 ASSESSMENT — PAIN DESCRIPTION - LOCATION: LOCATION: FLANK

## 2023-01-18 ASSESSMENT — PAIN - FUNCTIONAL ASSESSMENT
PAIN_FUNCTIONAL_ASSESSMENT: ACTIVITIES ARE NOT PREVENTED
PAIN_FUNCTIONAL_ASSESSMENT: ACTIVITIES ARE NOT PREVENTED

## 2023-01-18 ASSESSMENT — PAIN DESCRIPTION - ORIENTATION: ORIENTATION: RIGHT

## 2023-01-18 NOTE — PROGRESS NOTES
Hospitalist Progress Note      PCP: Andrae Babb    Date of Admission: 1/16/2023    Chief Complaint: R sided flank/back pain    Subjective:     Minimal cough  Still has pain but better today  Still had emesis today    Medications:  Reviewed    Infusion Medications    sodium chloride 100 mL/hr at 01/18/23 0549    sodium chloride       Scheduled Medications    potassium chloride  40 mEq Oral BID WC    naproxen  250 mg Oral BID WC    sodium chloride flush  5-40 mL IntraVENous 2 times per day    enoxaparin  30 mg SubCUTAneous BID    levothyroxine  224 mcg Oral QAM AC    vitamin B-12  50 mcg Oral Daily    oseltamivir  75 mg Oral BID     PRN Meds: promethazine, methocarbamol, sodium chloride flush, sodium chloride, ondansetron **OR** ondansetron, polyethylene glycol, acetaminophen **OR** acetaminophen      Intake/Output Summary (Last 24 hours) at 1/18/2023 1016  Last data filed at 1/18/2023 0836  Gross per 24 hour   Intake 1276.55 ml   Output 0 ml   Net 1276.55 ml         Exam:    /82   Pulse 84   Temp 97.5 °F (36.4 °C) (Oral)   Resp 18   Ht 5' 4\" (1.626 m)   Wt 282 lb 13.6 oz (128.3 kg)   LMP 10/17/2022 (Approximate)   SpO2 91%   BMI 48.55 kg/m²     General appearance: No apparent distress, appears stated age and cooperative. HEENT: Pupils equal, round, and reactive to light. Conjunctivae/corneas clear. Neck: Supple, with full range of motion. No jugular venous distention. Trachea midline. Respiratory:  Normal respiratory effort. Clear to auscultation, bilaterally without Rales/Wheezes/Rhonchi. Cardiovascular: Regular rate and rhythm with normal S1/S2 without murmurs, rubs or gallops. Abdomen: Soft, non-tender, non-distended with normal bowel sounds. Musculoskelatal: No clubbing, cyanosis or edema bilaterally. Skin: Skin color, texture, turgor normal.  No rashes or lesions.   Neurologic:  Cranial nerves: II-XII intact, grossly non-focal.  Psychiatric: Alert and oriented, thought content appropriate, normal insight    Labs:   Recent Labs     01/16/23  1136 01/17/23  0459 01/18/23  0528   WBC 6.7 5.7 4.4   HGB 11.7* 10.8* 10.4*   HCT 35.4* 33.5* 32.2*    259 254       Recent Labs     01/16/23  1136 01/17/23  0459 01/18/23  0528   * 136 138   K 4.2 3.2* 3.3*   CL 98* 99 103   CO2 26 25 26   BUN 6* 6* 6*   CREATININE 0.9 0.9 0.8   CALCIUM 9.5 8.6 8.3       Recent Labs     01/16/23  1136 01/17/23  0459   AST 41* 42*   ALT 39 40   BILIDIR <0.2  --    BILITOT 0.4 0.3   ALKPHOS 51 44       No results for input(s): INR in the last 72 hours. No results for input(s): [place] in the last 72 hours. Studies:  CT ABDOMEN PELVIS W IV CONTRAST Additional Contrast? None   Final Result   1. No acute abnormality of the abdomen or pelvis. 2. Normal appendix. 3. No obstructing urolithiasis. 4. Hepatic steatosis. XR CHEST (2 VW)   Final Result      No acute cardiopulmonary findings. Assessment/Plan:    Active Hospital Problems    Diagnosis Date Noted    Abdominal pain [R10.9] 01/16/2023     Priority: Medium     Flu A  Tamiflu  Continue IV fluids and anti emetics    Abdominal pain/right flank pain   LFTs, CT A/P, UA, preg test all negative  D-dimer neg  Reviewed CT AP     Tachycardia: likely due to Flu  consider early sepsis  Cont with IVF, follow up on thyroid function tests  Monitor on telemetry  Also obtain blood cx, flu and covid 19     Hypothyroidism:  Based on chart review- was hyperthyroid in July 2022, dose was lowered from 250 to 224 mcg  Follow up on current testing    Hypokalemia and hypomagnesemia  Replace and recheck     DM type 2:  ISS     DVT Prophylaxis: lovenox  Diet: ADULT DIET; Regular  Code Status: Full Code    PT/OT Eval Status:     Dispo - Inpatient dc when tolerating PO.      Gill Moon DO

## 2023-01-18 NOTE — PROGRESS NOTES
Physician Progress Note      PATIENT:               MIGUEL NAPOLES  [facility] #:                  [id number]  :                       1990  ADMIT DATE:       2023 10:28 AM  DISCH DATE:  RESPONDING  PROVIDER #:        Gill Moon DO          QUERY TEXT:    Patient admitted with abd pain, influenza A. Noted documentation of sepsis in   H&P. In order to support the diagnosis of sepsis, please include additional   clinical indicators in your documentation.  Or please document if the   diagnosis of sepsis has been ruled out after further study.    The medical record reflects the following:  Risk Factors: 33 yo w/ abd/flank pain, tachycardia  Clinical Indicators: Per PN : Tachycardia: likely due to Flu, consider   early sepsis.  WBC 6.7 - 4.4.  Temps 97.5 - 99.8.  Lactic acid 0.9.  Patient   reports that she developed chills.  Treatment: Lactic acid, blood cultures, IVF bolus  Options provided:  -- Sepsis was ruled out after study  -- Sepsis present as evidenced by, Please document evidence.  -- Other - I will add my own diagnosis  -- Disagree - Not applicable / Not valid  -- Disagree - Clinically unable to determine / Unknown  -- Refer to Clinical Documentation Reviewer    PROVIDER RESPONSE TEXT:    Sepsis was ruled out after study.    Query created by: Samreen Callahan on 2023 12:42 PM      Electronically signed by:  Gill Moon DO 2023 12:50 PM

## 2023-01-18 NOTE — CARE COORDINATION
3:09 PM  Upon review of pts chart, pt is from home, IPTA, no current home services. Pt denies a need for any. Pt has transport at time of dc. CM will sign off at this time. Please consult CM/SW if any dcp needs arise.     Electronically signed by Leatha Holm RN, CM on 1/18/2023 at 3:09 PM.  Phone: [phone]  Fax: [phone]

## 2023-01-18 NOTE — PROGRESS NOTES
Patient is A&O. VSS. Patient tolerating diet this shift without issues. Overall feels better. Small amount of pain, heat packs and tylenol given. UA sent. No other needs at this time.    Electronically signed by Korin Lynch RN on 1/18/2023 at 2:47 PM

## 2023-01-18 NOTE — DISCHARGE SUMMARY
Hospital Medicine Discharge Summary    Patient: Sabina Ceron     Gender: female  : 1990   Age: 28 y.o. MRN: [id number]    Admitting Physician: Monika Guillen MD  Discharge Physician: Jesusita Govea DO    Code Status: Full Code     Admit Date: 2023   Discharge Date:       Disposition:  Home ***    Discharge Diagnoses: Active Hospital Problems    Diagnosis Date Noted    Abdominal pain [R10.9] 2023     Priority: Medium         Outpatient to do list:     1) Follow-up appointments:    Primary care provider  {consultation:[id number]}    2) ***    Condition at Discharge: Stable    Hospital Course:   Patient was admitted for evaluation of ***, and found to have ***.       Additional findings or notes to primary provider:  None at this time    Discharge Medications:   Current Discharge Medication List        START taking these medications    Details   methocarbamol (ROBAXIN) 750 MG tablet Take 1 tablet by mouth 2 times daily as needed (muscle spasms)  Qty: 10 tablet, Refills: 0      oseltamivir (TAMIFLU) 75 MG capsule Take 1 capsule by mouth 2 times daily for 6 doses  Qty: 6 capsule, Refills: 0           Current Discharge Medication List        Current Discharge Medication List        CONTINUE these medications which have NOT CHANGED    Details   COLLAGEN PO Take by mouth daily      vitamin B-12 (CYANOCOBALAMIN) 100 MCG tablet Take 50 mcg by mouth daily      b complex vitamins capsule Take 1 capsule by mouth daily      Probiotic Product (PROBIOTIC-10 PO) Take by mouth daily      BIOTIN PO Take by mouth daily      NAPROXEN PO Take 220 mg by mouth as needed for Pain      levothyroxine (SYNTHROID) 112 MCG tablet Take 224 mcg by mouth every morning (before breakfast)           Current Discharge Medication List        STOP taking these medications       ASTAXANTHIN PO Comments:   Reason for Stopping:         ASHWAGANDHA PO Comments:   Reason for Stopping:         Misc Natural Products (GINSENG-AMERICAN PO) Comments:   Reason for Stopping:         NONFORMULARY Comments:   Reason for Stopping:         NONFORMULARY Comments:   Reason for Stopping:         ascorbic acid (VITAMIN C) 500 MG tablet Comments:   Reason for Stopping:         zinc sulfate (ZINCATE) 220 (50 Zn) MG capsule Comments:   Reason for Stopping:               Discharge ROS:  A complete review of systems was asked and negative except for ***     Discharge Exam:    /86   Pulse 87   Temp 97.2 °F (36.2 °C) (Oral)   Resp 17   Ht 5' 4\" (1.626 m)   Wt 282 lb 13.6 oz (128.3 kg)   LMP 10/17/2022 (Approximate)   SpO2 96%   BMI 48.55 kg/m²   General appearance:  NAD  HEENT:   Normal cephalic, atraumatic, moist mucous membranes, no oropharyngeal erythema or exudate  Neck: Supple, trachea midline, no anterior cervical or SC LAD  Heart[de-identified] Normal s1/s2, RRR, no murmurs, gallops, or rubs. No leg edema  Lungs:  Clear to auscultation bilaterally, no wheeze, rales or rhonchi, no use of accessory musclesNormal respiratory effort. Clear to auscultation, bilaterally without Rales/Wheezes/Rhonchi. Abdomen: Soft, non-tender, non-distended, bowel sounds present, no masses  Musculoskeletal:  No clubbing, no cyanosis, or edema  Skin: No lesion or masses  Neurologic:  Neurovascularly intact without any focal sensory/motor deficits. Cranial nerves: II-XII intact, grossly non-focal.  Psychiatric:  Alert and oriented, thought content appropriate    Labs:  For convenience and continuity at follow-up the following most recent labs are provided:    Lab Results   Component Value Date/Time    WBC 4.4 01/18/2023 05:28 AM    HGB 10.4 01/18/2023 05:28 AM    HCT 32.2 01/18/2023 05:28 AM    MCV 74.6 01/18/2023 05:28 AM     01/18/2023 05:28 AM     01/18/2023 05:28 AM    K 3.3 01/18/2023 05:28 AM    K 3.2 01/17/2023 04:59 AM     01/18/2023 05:28 AM    CO2 26 01/18/2023 05:28 AM    BUN 6 01/18/2023 05:28 AM    CREATININE 0.8 01/18/2023 05:28 AM    CALCIUM 8.3 01/18/2023 05:28 AM    ALKPHOS 42 01/18/2023 05:28 AM    ALT 43 01/18/2023 05:28 AM    AST 43 01/18/2023 05:28 AM    BILITOT <0.2 01/18/2023 05:28 AM    BILIDIR <0.2 01/18/2023 05:28 AM    LABALBU 3.5 01/18/2023 05:28 AM     No results found for: INR    Radiology:  XR CHEST (2 VW)    Result Date: 1/16/2023  EXAM: CHEST 2 VIEWS INDICATION: Cough COMPARISON: None FINDINGS: The cardiomediastinal silhouette is within normal limits. No focal consolidation. No pleural effusion or pneumothorax. Bones are unremarkable. No acute cardiopulmonary findings. CT ABDOMEN PELVIS W IV CONTRAST Additional Contrast? None    Result Date: 1/16/2023  EXAM: CT Abdomen and Pelvis with Contrast INDICATION: right sided abd pain, flank pain COMPARISON: None TECHNIQUE: Multiplanar reformatted images of the abdomen and pelvis are provided for review. Up-to-date CT equipment and radiation dose reduction techniques were employed. IV Contrast: 80 mL, Isovue-370 Oral Contrast: None CT radiation dose optimization techniques (automated exposure control, use of a iterative reconstruction techniques, or adjustment of the mA or KV according to the patients size) were used to limit patient radiation dose. FINDINGS: Lung bases: There is bibasilar atelectasis. Liver: Decreased attenuation of the hepatic parenchyma reflect steatosis. There is no focal hepatic lesion. Gallbladder and Biliary Tree: No calcified gallstones. Normal wall thickness. No intra- or extrahepatic biliary dilatation. Pancreas: Normal. Spleen: Normal. Adrenal Glands: Normal. Kidneys and Ureters: No obstructing urolithiasis or hydronephrosis. Renal parenchymal enhancement is symmetric. Urinary Bladder: Normal. Bowel: Normal diameter, nonobstructed. Appendix without thickening or enlargement. Reproductive Organs: No associated masses. Lymph Nodes: No abnormally enlarged nodes. Peritoneum/Retroperitoneum: No ascites or free air. Vessels:  Aorta and IVC without significant abnormality. Splenic vein, SMV, PV and hepatic veins demonstrate enhancement. Abdominal Wall: Tiny fat-containing umbilical hernia. Bones: No significant abnormality. Other Findings: None. 1. No acute abnormality of the abdomen or pelvis. 2. Normal appendix. 3. No obstructing urolithiasis. 4. Hepatic steatosis. The patient was seen and examined on day of discharge and this discharge summary is in conjunction with any daily progress note from day of discharge. Time Spent on discharge is more than 30 minutes in the examination, evaluation, counseling and review of medications and discharge plan. Carlota Mendiola DO   1/18/2023      Thank you HILARIA CANTU for the opportunity to be involved in this patient's care. If you have any questions or concerns please feel free to contact me at [email or web address].

## 2023-01-18 NOTE — PROGRESS NOTES
Patient A&Ox4. VSS. IV fluids infusing per order. Pt had one episode of emesis that was yellow/green. Zofran given once per order. Pt denies pain at this time. Pt voiding freely. Pt remains in droplet precautions. Bed is in lowest setting with 2/4 side rails up. Call light is within reach.

## 2023-01-20 LAB
BLOOD CULTURE, ROUTINE: NORMAL
CULTURE, BLOOD 2: NORMAL

## 2023-01-21 LAB — T3 REVERSE: 36.6 NG/DL (ref 9–27)
